# Patient Record
Sex: FEMALE | Race: BLACK OR AFRICAN AMERICAN | Employment: FULL TIME | URBAN - METROPOLITAN AREA
[De-identification: names, ages, dates, MRNs, and addresses within clinical notes are randomized per-mention and may not be internally consistent; named-entity substitution may affect disease eponyms.]

---

## 2019-03-11 ENCOUNTER — HOSPITAL ENCOUNTER (EMERGENCY)
Facility: HOSPITAL | Age: 19
Discharge: HOME | End: 2019-03-11
Attending: EMERGENCY MEDICINE
Payer: MEDICAID

## 2019-03-11 VITALS
WEIGHT: 293 LBS | RESPIRATION RATE: 20 BRPM | BODY MASS INDEX: 48.82 KG/M2 | TEMPERATURE: 98 F | HEIGHT: 65 IN | DIASTOLIC BLOOD PRESSURE: 63 MMHG | SYSTOLIC BLOOD PRESSURE: 104 MMHG | OXYGEN SATURATION: 98 % | HEART RATE: 82 BPM

## 2019-03-11 DIAGNOSIS — T78.40XA ALLERGIC REACTION, INITIAL ENCOUNTER: ICD-10-CM

## 2019-03-11 DIAGNOSIS — L03.115 CELLULITIS OF RIGHT LEG WITHOUT FOOT: Primary | ICD-10-CM

## 2019-03-11 PROCEDURE — 99283 EMERGENCY DEPT VISIT LOW MDM: CPT

## 2019-03-11 RX ORDER — LAMOTRIGINE 25 MG/1
TABLET, CHEWABLE ORAL
Status: ON HOLD | COMMUNITY
End: 2021-04-08

## 2019-03-11 RX ORDER — TOPIRAMATE 25 MG/1
25 TABLET ORAL 2 TIMES DAILY
Status: ON HOLD | COMMUNITY
End: 2021-04-08

## 2019-03-11 RX ORDER — CEFADROXIL 500 MG/1
500 CAPSULE ORAL 2 TIMES DAILY
Qty: 20 CAPSULE | Refills: 0 | Status: SHIPPED | OUTPATIENT
Start: 2019-03-11 | End: 2019-03-21

## 2019-03-11 RX ORDER — WARFARIN SODIUM 5 MG/1
5 TABLET ORAL NIGHTLY
Status: ON HOLD | COMMUNITY
End: 2021-04-08

## 2019-03-11 RX ORDER — WARFARIN SODIUM 4 MG/1
4 TABLET ORAL DAILY
Status: ON HOLD | COMMUNITY
End: 2021-04-08

## 2019-03-11 RX ORDER — HYDROCODONE BITARTRATE AND ACETAMINOPHEN 10; 325 MG/1; MG/1
1 TABLET ORAL EVERY 6 HOURS PRN
Status: ON HOLD | COMMUNITY
End: 2021-04-08

## 2019-03-11 RX ORDER — DIPHENHYDRAMINE HCL 25 MG
25 CAPSULE ORAL ONCE
Status: COMPLETED | OUTPATIENT
Start: 2019-03-11 | End: 2019-03-11

## 2019-03-12 NOTE — ED INITIAL ASSESSMENT (HPI)
States that she was in the hospital on 3/8 for DVT's and was released. States she has started to develop \" bumps\" to the LE. No C./I fever. Has swelling to the RLE. No fever.  No drainage., No other complaints

## 2019-03-12 NOTE — ED PROVIDER NOTES
Patient Seen in: [facility] AND [facility] Emergency Department    History     Stated Complaint: Cellulitis    HPI    19year old female with morbid obesity,recent dx DVT on coumadin who presents with itching and redness to RLE.  Pt reports while in hospital she Skin: Skin is warm and dry. Rash (+ few small bumps without papule or pustule, no induration -- located scattered on legs and few to dorsal arms ) noted. Rash is macular. She is not diaphoretic. Psychiatric: She has a normal mood and affect.  Her beh

## 2021-04-07 ENCOUNTER — APPOINTMENT (OUTPATIENT)
Dept: GENERAL RADIOLOGY | Facility: HOSPITAL | Age: 21
DRG: 603 | End: 2021-04-07
Payer: MEDICAID

## 2021-04-07 ENCOUNTER — HOSPITAL ENCOUNTER (OUTPATIENT)
Facility: HOSPITAL | Age: 21
Setting detail: OBSERVATION
Discharge: HOME | DRG: 603 | End: 2021-04-09
Admitting: HOSPITALIST
Payer: MEDICAID

## 2021-04-07 DIAGNOSIS — L03.011 CELLULITIS OF FINGER OF RIGHT HAND: ICD-10-CM

## 2021-04-07 DIAGNOSIS — T78.2XXA ANAPHYLAXIS, INITIAL ENCOUNTER: Primary | ICD-10-CM

## 2021-04-07 PROCEDURE — 80048 BASIC METABOLIC PNL TOTAL CA: CPT | Performed by: EMERGENCY MEDICINE

## 2021-04-07 PROCEDURE — 85610 PROTHROMBIN TIME: CPT | Performed by: EMERGENCY MEDICINE

## 2021-04-07 PROCEDURE — 94640 AIRWAY INHALATION TREATMENT: CPT

## 2021-04-07 PROCEDURE — 83540 ASSAY OF IRON: CPT | Performed by: HOSPITALIST

## 2021-04-07 PROCEDURE — 85060 BLOOD SMEAR INTERPRETATION: CPT | Performed by: EMERGENCY MEDICINE

## 2021-04-07 PROCEDURE — 96375 TX/PRO/DX INJ NEW DRUG ADDON: CPT

## 2021-04-07 PROCEDURE — 73140 X-RAY EXAM OF FINGER(S): CPT

## 2021-04-07 PROCEDURE — 96372 THER/PROPH/DIAG INJ SC/IM: CPT

## 2021-04-07 PROCEDURE — 93005 ELECTROCARDIOGRAM TRACING: CPT

## 2021-04-07 PROCEDURE — 93010 ELECTROCARDIOGRAM REPORT: CPT | Performed by: EMERGENCY MEDICINE

## 2021-04-07 PROCEDURE — S0028 INJECTION, FAMOTIDINE, 20 MG: HCPCS

## 2021-04-07 PROCEDURE — 85652 RBC SED RATE AUTOMATED: CPT | Performed by: EMERGENCY MEDICINE

## 2021-04-07 PROCEDURE — 85025 COMPLETE CBC W/AUTO DIFF WBC: CPT | Performed by: EMERGENCY MEDICINE

## 2021-04-07 PROCEDURE — 84466 ASSAY OF TRANSFERRIN: CPT | Performed by: HOSPITALIST

## 2021-04-07 PROCEDURE — 96374 THER/PROPH/DIAG INJ IV PUSH: CPT

## 2021-04-07 PROCEDURE — 99291 CRITICAL CARE FIRST HOUR: CPT

## 2021-04-07 PROCEDURE — 86140 C-REACTIVE PROTEIN: CPT | Performed by: EMERGENCY MEDICINE

## 2021-04-07 RX ORDER — FAMOTIDINE 10 MG/ML
INJECTION, SOLUTION INTRAVENOUS
Status: COMPLETED
Start: 2021-04-07 | End: 2021-04-07

## 2021-04-07 RX ORDER — FAMOTIDINE 10 MG/ML
20 INJECTION, SOLUTION INTRAVENOUS ONCE
Status: COMPLETED | OUTPATIENT
Start: 2021-04-07 | End: 2021-04-07

## 2021-04-07 RX ORDER — KETOROLAC TROMETHAMINE 15 MG/ML
15 INJECTION, SOLUTION INTRAMUSCULAR; INTRAVENOUS ONCE
Status: COMPLETED | OUTPATIENT
Start: 2021-04-07 | End: 2021-04-07

## 2021-04-07 RX ORDER — ONDANSETRON 2 MG/ML
4 INJECTION INTRAMUSCULAR; INTRAVENOUS EVERY 6 HOURS PRN
Status: DISCONTINUED | OUTPATIENT
Start: 2021-04-07 | End: 2021-04-09

## 2021-04-07 RX ORDER — HYDROCODONE BITARTRATE AND ACETAMINOPHEN 5; 325 MG/1; MG/1
1 TABLET ORAL EVERY 4 HOURS PRN
Status: DISCONTINUED | OUTPATIENT
Start: 2021-04-07 | End: 2021-04-09

## 2021-04-07 RX ORDER — MORPHINE SULFATE 4 MG/ML
4 INJECTION, SOLUTION INTRAMUSCULAR; INTRAVENOUS ONCE
Status: COMPLETED | OUTPATIENT
Start: 2021-04-07 | End: 2021-04-07

## 2021-04-07 RX ORDER — VANCOMYCIN 2 GRAM/500 ML IN 0.9 % SODIUM CHLORIDE INTRAVENOUS
2000 EVERY 12 HOURS
Status: DISCONTINUED | OUTPATIENT
Start: 2021-04-08 | End: 2021-04-09

## 2021-04-07 RX ORDER — DIPHENHYDRAMINE HYDROCHLORIDE 50 MG/ML
25 INJECTION INTRAMUSCULAR; INTRAVENOUS ONCE
Status: COMPLETED | OUTPATIENT
Start: 2021-04-07 | End: 2021-04-07

## 2021-04-07 RX ORDER — VANCOMYCIN 2 GRAM/500 ML IN 0.9 % SODIUM CHLORIDE INTRAVENOUS
25 ONCE
Status: COMPLETED | OUTPATIENT
Start: 2021-04-07 | End: 2021-04-07

## 2021-04-07 RX ORDER — HYDROCODONE BITARTRATE AND ACETAMINOPHEN 5; 325 MG/1; MG/1
2 TABLET ORAL EVERY 4 HOURS PRN
Status: DISCONTINUED | OUTPATIENT
Start: 2021-04-07 | End: 2021-04-09

## 2021-04-07 RX ORDER — METHYLPREDNISOLONE SODIUM SUCCINATE 125 MG/2ML
125 INJECTION, POWDER, LYOPHILIZED, FOR SOLUTION INTRAMUSCULAR; INTRAVENOUS ONCE
Status: COMPLETED | OUTPATIENT
Start: 2021-04-07 | End: 2021-04-07

## 2021-04-07 RX ORDER — IPRATROPIUM BROMIDE AND ALBUTEROL SULFATE 2.5; .5 MG/3ML; MG/3ML
3 SOLUTION RESPIRATORY (INHALATION) ONCE
Status: COMPLETED | OUTPATIENT
Start: 2021-04-07 | End: 2021-04-07

## 2021-04-07 RX ORDER — HEPARIN SODIUM 5000 [USP'U]/ML
5000 INJECTION, SOLUTION INTRAVENOUS; SUBCUTANEOUS EVERY 8 HOURS SCHEDULED
Status: DISCONTINUED | OUTPATIENT
Start: 2021-04-07 | End: 2021-04-08

## 2021-04-07 RX ORDER — DIPHENHYDRAMINE HCL 25 MG
25 CAPSULE ORAL EVERY 6 HOURS PRN
Status: DISCONTINUED | OUTPATIENT
Start: 2021-04-07 | End: 2021-04-09

## 2021-04-07 RX ORDER — DIPHENHYDRAMINE HYDROCHLORIDE 50 MG/ML
INJECTION INTRAMUSCULAR; INTRAVENOUS
Status: COMPLETED
Start: 2021-04-07 | End: 2021-04-07

## 2021-04-07 RX ORDER — METHYLPREDNISOLONE SODIUM SUCCINATE 125 MG/2ML
INJECTION, POWDER, LYOPHILIZED, FOR SOLUTION INTRAMUSCULAR; INTRAVENOUS
Status: COMPLETED
Start: 2021-04-07 | End: 2021-04-07

## 2021-04-07 RX ORDER — ACETAMINOPHEN 325 MG/1
650 TABLET ORAL EVERY 4 HOURS PRN
Status: DISCONTINUED | OUTPATIENT
Start: 2021-04-07 | End: 2021-04-09

## 2021-04-07 NOTE — ED INITIAL ASSESSMENT (HPI)
Patient complaining of pain to her right ring finger for the past 2 days. States she has been unable to move it due to pain.

## 2021-04-07 NOTE — ED QUICK NOTES
Orders for admission, patient is aware of plan and ready to go upstairs. Any questions, please call ED RN Pipo Lang at extension [phone].    Type of COVID test sent:Rapid  COVID Suspicion level: Low    Titratable drug(s) infusing:  Rate:    LOC at time of transpor

## 2021-04-07 NOTE — ED QUICK NOTES
Pt became itchy after starting unasyn. Pt verified she has no known allergies. Pt began coughing/wheezing with spo2 85%. Dr. Breana Corrales at bedside, respiratory at bedside for neb and meds given.

## 2021-04-07 NOTE — ED PROVIDER NOTES
Patient Seen in: [facility] AND [facility] Emergency Department      History   Patient presents with:  Arm or Hand Injury    Stated Complaint: Pain right ring finger    HPI/Subjective:   HPI    20-year-old female presents for evaluation of her severe pain to her and regular rhythm. Pulses: Normal pulses. Pulmonary:      Effort: Pulmonary effort is normal. No respiratory distress. Breath sounds: Normal air entry. Abdominal:      General: Abdomen is flat.  Bowel sounds are normal.      Palpations: Abdom ---------                               -----------         ------                     CBC W/ DIFFERENTIAL[[id number]]          Abnormal            Preliminary result           Please view results for these tests on the individual orders.    MD BLOOD SMEAR EFFUSION: None visible. OTHER: Negative. CONCLUSION:  1. No osseous abnormalities. 2. No radiopaque soft tissue foreign body.     Dictated by (CST): Eliana Bingham MD on 4/07/2021 at 2:19 PM     Finalized by (CST): David Bingham M

## 2021-04-08 ENCOUNTER — APPOINTMENT (OUTPATIENT)
Dept: ULTRASOUND IMAGING | Facility: HOSPITAL | Age: 21
DRG: 603 | End: 2021-04-08
Attending: HOSPITALIST
Payer: MEDICAID

## 2021-04-08 PROCEDURE — 85027 COMPLETE CBC AUTOMATED: CPT | Performed by: HOSPITALIST

## 2021-04-08 PROCEDURE — 93970 EXTREMITY STUDY: CPT | Performed by: HOSPITALIST

## 2021-04-08 PROCEDURE — 80048 BASIC METABOLIC PNL TOTAL CA: CPT | Performed by: HOSPITALIST

## 2021-04-08 PROCEDURE — 85060 BLOOD SMEAR INTERPRETATION: CPT | Performed by: HOSPITALIST

## 2021-04-08 PROCEDURE — S0028 INJECTION, FAMOTIDINE, 20 MG: HCPCS | Performed by: HOSPITALIST

## 2021-04-08 RX ORDER — DIPHENHYDRAMINE HYDROCHLORIDE 50 MG/ML
25 INJECTION INTRAMUSCULAR; INTRAVENOUS 2 TIMES DAILY PRN
Status: DISCONTINUED | OUTPATIENT
Start: 2021-04-08 | End: 2021-04-09

## 2021-04-08 RX ORDER — HEPARIN SODIUM 5000 [USP'U]/ML
7500 INJECTION, SOLUTION INTRAVENOUS; SUBCUTANEOUS EVERY 8 HOURS SCHEDULED
Status: DISCONTINUED | OUTPATIENT
Start: 2021-04-08 | End: 2021-04-09

## 2021-04-08 RX ORDER — CLINDAMYCIN HYDROCHLORIDE 300 MG/1
300 CAPSULE ORAL 3 TIMES DAILY
Qty: 30 CAPSULE | Refills: 0 | Status: SHIPPED | OUTPATIENT
Start: 2021-04-08 | End: 2021-04-09

## 2021-04-08 RX ORDER — METHYLPREDNISOLONE SODIUM SUCCINATE 125 MG/2ML
125 INJECTION, POWDER, LYOPHILIZED, FOR SOLUTION INTRAMUSCULAR; INTRAVENOUS ONCE
Status: COMPLETED | OUTPATIENT
Start: 2021-04-08 | End: 2021-04-08

## 2021-04-08 RX ORDER — DOXYCYCLINE HYCLATE 100 MG/1
100 CAPSULE ORAL EVERY 12 HOURS SCHEDULED
Status: DISCONTINUED | OUTPATIENT
Start: 2021-04-09 | End: 2021-04-09

## 2021-04-08 RX ORDER — MELATONIN
325
Qty: 30 TABLET | Refills: 0 | Status: SHIPPED | OUTPATIENT
Start: 2021-04-09

## 2021-04-08 RX ORDER — MELATONIN
325
Status: DISCONTINUED | OUTPATIENT
Start: 2021-04-09 | End: 2021-04-09

## 2021-04-08 RX ORDER — FAMOTIDINE 10 MG/ML
20 INJECTION, SOLUTION INTRAVENOUS ONCE
Status: COMPLETED | OUTPATIENT
Start: 2021-04-08 | End: 2021-04-08

## 2021-04-08 RX ORDER — CLINDAMYCIN HYDROCHLORIDE 300 MG/1
300 CAPSULE ORAL ONCE
Status: COMPLETED | OUTPATIENT
Start: 2021-04-08 | End: 2021-04-08

## 2021-04-08 NOTE — H&P
[facility] Hospitalist H&P     CC: Patient presents with:  Arm or Hand Injury     PCP: None Pcp    Date of Admission: 4/7/2021  1:56 PM    ASSESSMENT / PLAN:     Ms. Delmer Dang is a 22 yo F with PMH of morbid obesity, extensive bilateral DVT 2019 no longer on couma past 2 days. She recently had a new tattoo on her R and L forearm. She states she did notice some pain and purulent drainage from the tattoo on there L arm but then noticed she had severe finger pain and inability to move her R ring finger.  Denies fevers o 04/07/2021       No results for input(s): TROP in the last 168 hours. Additional Diagnostics:    Radiology: XR FINGER(S) (MIN 2 VIEWS), RIGHT 4TH (CPT=73140)    Result Date: 4/7/2021  CONCLUSION:  1. No osseous abnormalities.  2. No radiopaque soft tissu

## 2021-04-08 NOTE — PROGRESS NOTES
[facility] Dosing Service    Initial Pharmacokinetic Consult for Vancomycin Dosing     Alba Blake is a 23year old patient who is being treated for  tenosynovitis .   Pharmacy has been asked to dose Vancomycin by Dr. Wen Adjutant:  Clinton Weber [

## 2021-04-08 NOTE — PROGRESS NOTES
[facility] Hospitalist Progress Note     CC: Hospital Follow up    PCP: None Pcp       Assessment/Plan:     Principal Problem:    Anaphylaxis, initial encounter  Active Problems:    Cellulitis of finger of right hand    Anaphylactic syndrome    Ms. Agustin Backer is a 23 Subjective:     Ring finger feels much improved. Ortho eval, no need for surgery. OBJECTIVE:    Blood pressure 124/71, pulse 78, temperature 97.7 °F (36.5 °C), temperature source Oral, resp.  rate 16, height 5' 7.2\" (1.707 m), weight (!) 470 lb (213.2 (FOO=[id number])    Result Date: 4/8/2021  CONCLUSION:  1. Right femoral and both popliteal veins are normal. 2. 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  3. The distal left femoral vein as well as the infrapopliteal veins are not seen bilateral

## 2021-04-08 NOTE — CM/SW NOTE
Received MD[facility] for list of in network list of OB & PCP options. Reviewed [employer] website, printed list of OB & PCP options.  Provided Karina MCCANN w/ list.    Lauren Patel, [address]

## 2021-04-08 NOTE — CONSULTS
[facility]    Report of Consultation    Ren Willingham Patient Status:  Inpatient    2000 MRN [id number]   Location [facility] 4W/SW/SE Attending Silverio Blue MD   Hosp Day # 1 PCP None Pcp     Date of Admission:   scleral icterus. Mucous membranes are moist. Pupils are equal and round, reactive to light and accommodate. Pupils are approximately 3mm and react to 2mm with reaction to light. Oropharynx is clear. Normocephalic, atraumatic.   Neck: No tenderness to pa patient:  [address]  4/8/2021  11:46 AM

## 2021-04-08 NOTE — PAYOR COMM NOTE
--------------  ADMISSION REVIEW     Payor: [address] [place] [address] #:  [id number]  Authorization Number: [id number]    Admit date: 4/7/21  Admit time:  8:40 PM       Admitting Physician: Amanda Lovnig MD  Attending Physician:  Shawnee Melendez, pulses. Pulmonary:      Effort: Pulmonary effort is normal. No respiratory distress. Breath sounds: Normal air entry. Abdominal:      General: Abdomen is flat. Bowel sounds are normal.      Palpations: Abdomen is soft.    Musculoskeletal:         G Benadryl, Pepcid, and IM epinephrine. She also received a DuoNeb. EKG    Rate, intervals and axes as noted on EKG Report. Rate: 125  Rhythm: Sinus Rhythm  Reading: No STEMI. Interpreted by ED physician. MDM    CBC BMP unremarkable.   Guille Melena (Physician)           Bryan Srinivasan H&P     CC: Patient presents with:  Arm or Hand Injury     PCP: None Pcp    Date of Admission: 4/7/2021  1:56 PM    ASSESSMENT / PLAN:     Ms. Jb Hartmann is a 24 yo F with PMH of morbid obesity, extensive bilateral  EPINEPHrine HCl (ADRENALIN) 1 MG/ML injection 0.3 mg     Date Action Dose Route User    4/7/2021 1808 Given 0.3 mg Intramuscular (Left Arm) Wilfredo Dry, RN      famoTIDine (PEPCID) injection 20 mg     Date Action Dose Route User    4/7/2021 1809 G

## 2021-04-08 NOTE — PROGRESS NOTES
[facility] Pharmacy Note:  Anticoagulation Weight Dose Adjustment for heparin    Deondre Duarte is a 23year old patient who has been prescribed heparin 5000 units every 8 hours. Estimated Creatinine Clearance: 97.2 mL/min (based on SCr of 0.89 mg/dL).  Body

## 2021-04-08 NOTE — PLAN OF CARE
Pt is alert and oriented x4. NPO since midnight. Denies any nausea. Pt states \"pain to finger has improved. \" Pt denies any shortness of breath. IV Vanco finished without any reaction noted. Voiding. Up with standby assistance. Fall precautions in place. drain sites and surrounding tissue  - Implement wound care per orders  - Initiate isolation precautions as appropriate  - Initiate Pressure Ulcer prevention bundle as indicated  Outcome: Progressing     Problem: MUSCULOSKELETAL - ADULT  Goal: Maintain prop

## 2021-04-08 NOTE — PLAN OF CARE
Ingrid Garibay is aware of POC at this time. No complaints of pain througout the shift. IV abx given per orders. Rate of vanco decreased to 175mL/hr as patient was c/o itching. Benadryl given with relief.      ID saw patient and plan was for to discharge w pressure ulcer development  - Assess and document skin integrity  - Assess and document dressing/incision, wound bed, drain sites and surrounding tissue  - Implement wound care per orders  - Initiate isolation precautions as appropriate  - Initiate Pressur Consider OT/PT consult to assist with strengthening/mobility  - Encourage toileting schedule  Outcome: Progressing

## 2021-04-08 NOTE — CONSULTS
[facility] AND [facility] Infectious Disease  Report of Consultation    Perry Kincaid Patient Status:  Inpatient    2000 MRN [id number]   Location [facility] 4W/SW/SE Attending Deysi Tomlin MD   Hosp Day # 1 PCP None Pcp     Date of A IVPB premix 2g in 0.9% NaCl 500 mL, 2,000 mg, Intravenous, Q12H  •  diphenhydrAMINE (BENADRYL) cap/tab 25 mg, 25 mg, Oral, Q6H PRN    Review of Systems:    Constitutional:  No fevers, chills, diaphoresis, weight changes.    HEENT:  No visual changes, oral u [Urine:550]    Physical Exam:   General: Awake, alert, non-tox and in NAD. Head: Normocephalic, without obvious abnormality, atraumatic. Eyes: Conjunctivae/corneas clear. No scleral icterus. No conjunctival     hemorrhage.    Nose: Nares normal.   Thr Disease  [phone]    4/8/2021  12:49 PM

## 2021-04-09 VITALS
WEIGHT: 293 LBS | BODY MASS INDEX: 45.45 KG/M2 | HEART RATE: 72 BPM | TEMPERATURE: 98 F | DIASTOLIC BLOOD PRESSURE: 65 MMHG | OXYGEN SATURATION: 100 % | RESPIRATION RATE: 18 BRPM | HEIGHT: 67.2 IN | SYSTOLIC BLOOD PRESSURE: 130 MMHG

## 2021-04-09 PROCEDURE — 80202 ASSAY OF VANCOMYCIN: CPT | Performed by: HOSPITALIST

## 2021-04-09 RX ORDER — DOXYCYCLINE HYCLATE 100 MG/1
100 CAPSULE ORAL EVERY 12 HOURS SCHEDULED
Qty: 20 CAPSULE | Refills: 0 | Status: SHIPPED | OUTPATIENT
Start: 2021-04-09 | End: 2021-04-19

## 2021-04-09 NOTE — PLAN OF CARE
Pt is alert and oriented x4. Tolerating general diet. Pt denies any shortness of breath or itching. IV Vanco finished without any reaction noted. Voiding. Up independently. Fall precautions in place. Call light within reach.      Problem: Patient Centered C care per orders  - Initiate isolation precautions as appropriate  - Initiate Pressure Ulcer prevention bundle as indicated  Outcome: Progressing     Problem: MUSCULOSKELETAL - ADULT  Goal: Maintain proper alignment of affected body part  Description: INTER

## 2021-04-09 NOTE — PLAN OF CARE
Discharge instructions given to patient. She verbalizes understanding. PIV removed. She tolerated doxycyline. No reaction observed. Pt taken home with mother.     Problem: Patient Centered Care  Goal: Patient preferences are identified and integrated in the Initiate isolation precautions as appropriate  - Initiate Pressure Ulcer prevention bundle as indicated  Outcome: Adequate for Discharge     Problem: MUSCULOSKELETAL - ADULT  Goal: Maintain proper alignment of affected body part  Description: INTERVENTIONS

## 2021-04-09 NOTE — DISCHARGE SUMMARY
General Medicine Discharge Summary     Patient ID:  Chula Odom  23year old  11/6/2000    Admit date: 4/7/2021    Discharge date and time: 04/09/21    Attending Physician: Ji Raygoza MD     Primary Care Physician: None Pcp     Reason for admis anaphylaxis, now on vancomycin  - ortho evaluated today, much improved overnight with IV abx only, no need for surgery     Anaphylactic reaction   - occurred after receiving Unasyn, no known drug allergies  - s/p epinephrine, solumedrol, benadryl, pepcid, 12:28 PM     Finalized by (CST): Ke Fernandes MD on 4/08/2021 at 12:38 PM                Home Medication Changes:     I reconciled current and discharge medications on day of discharge. These medication changes have been made as below.      Start  Doxy

## 2021-04-09 NOTE — PROGRESS NOTES
Chula Odom is a 23year old female. Patient presents with:  Arm or Hand Injury      HPI:    Problem with clindamycin itching resolved  Tolerated doxy  Finger feeling better per pt.     REVIEW OF SYSTEMS:   A comprehensive 11 point review of systems w desired  4.  Spoke with pt re:anemia and texted with dr mchugh                 Results for orders placed or performed during the hospital encounter of 70/19/71   BASIC METABOLIC PANEL (8)   Result Value Ref Range    Glucose 84 70 - 99 mg/dL    Sodium 139 136 - Neutrophils Present (A) (none)   IRON AND TIBC   Result Value Ref Range    Iron 14 (L) 50 - 170 ug/dL    Transferrin 290 200 - 360 mg/dL    Total Iron Binding Capacity 432 240 - 450 ug/dL    % Saturation 3 (L) 15 - 50 %   BASIC METABOLIC PANEL (8)   Result 21.1 (H) 11.0 - 15.0 %    .0 (H) 150.0 - 450.0 10(3)uL    Neutrophil Absolute Prelim 4.14 1.50 - 7.70 x10 (3) uL    Neutrophil Absolute 4.14 1.50 - 7.70 x10(3) uL    Lymphocyte Absolute 2.28 1.00 - 4.00 x10(3) uL    Monocyte Absolute 0.67 0.10 - 1.0

## 2021-04-12 ENCOUNTER — PATIENT OUTREACH (OUTPATIENT)
Dept: CASE MANAGEMENT | Age: 21
End: 2021-04-12

## 2021-04-12 NOTE — PAYOR COMM NOTE
--------------  DISCHARGE REVIEW    Payor: [address] [place] [address] #:  [id number]  Authorization Number: [id number]    Admit date: 4/7/21  Admit time:   8:40 PM  Discharge Date: 4/9/2021  2:37 PM     Admitting Physician: Ember Munoz MD  Attendi by anaphylaxis to Unasyn in the ER, received epinephrine, solumedrol, pepcid and benadryl. Tolerated vancomycin during stay. ID planned for clindamycin on discharge however patient did complain of tongue and throat itching so this was discontinued.  Plan fo RIGHT 4TH (CPT=73140)    Result Date: 4/7/2021  CONCLUSION:  1. No osseous abnormalities. 2. No radiopaque soft tissue foreign body.     Dictated by (CST): Ruben Bingham MD on 4/07/2021 at 2:19 PM     Finalized by (CST): Ruben Bingham MD instructions:         Follow-up with labs: cbc    Total Time Coordinating Care: Greater than 30 minutes    Patient had opportunity to ask questions and state understand and agree with therapeutic plan as outlined      Onetha Sicard, MD  [place] Hospitalist

## 2021-04-12 NOTE — PROGRESS NOTES
NCM attempted to reach pt for HFU/condition update. Someone answered phone, but then hung up. Tried calling back and got a busy tone. Will try again. Need to confirm:  Insurance, PCP?, and offer TCC appt.

## 2021-04-13 NOTE — PROGRESS NOTES
NCM attempted to contact patient for hospital follow up. Patient answered and said she was busy and that she will call back. NCM provided contact information.

## 2021-04-19 NOTE — PAYOR COMM NOTE
FOR RECONSIDERATION AND APPROVAL PLEASE    CONTINUED STAY REVIEW    Payor: [address] [place] [address] #:  [id number]  Authorization Number: [id number]    Admit date: 4/7/21  Admit time:  8:40 PM    Admitting Physician: Patsy Boss MD  Attending Ph ESRML [id number] 04/07/2021     CRP 1.73 04/07/2021     Assessment and Plan:  1.   Post-tattoo cellulitis to the R hand with swelling and pain concentrated over the R 4th finger  - No evidence of deeper seated infection or tenosynovitis per ortho  - IV vancomycin o 04/07/21 1804 End: 04/07/21 1804 1804-Given              Doxycycline Hyclate (VIBRAMYCIN) cap 100 mg   Dose: 100 mg  Freq: 2 times per day Route: OR  Start: 04/09/21 1000 End: 04/09/21 1637    Order specific questions:   What infection is this being use Sodium Succ (Solu-MEDROL) injection 125 mg   Dose: 125 mg  Freq:  Once Route: IV  Start: 04/08/21 1715 End: 04/08/21 1838    Admin Instructions:   Amy Douglas   : cabinet override  Reconstitute with 2ml sterile water or saline     1838-Given                diphenhydrAMINE (BENADRYL) cap/tab 25 mg   Dose: 25 mg  Freq: Every 6 hours PRN Route: OR  PRN Reason: Itching  Start: 04/07/21 2233 End: 04/09/21 1637     0849-Given          1637-D/C'd          diphenhydrAMINE HCl (BENADRYL) IV PUSH injection 25

## 2021-07-07 ENCOUNTER — HOSPITAL ENCOUNTER (EMERGENCY)
Facility: HOSPITAL | Age: 21
Discharge: HOME | End: 2021-07-07
Attending: EMERGENCY MEDICINE
Payer: MEDICAID

## 2021-07-07 VITALS
TEMPERATURE: 98 F | RESPIRATION RATE: 22 BRPM | WEIGHT: 293 LBS | BODY MASS INDEX: 47.09 KG/M2 | SYSTOLIC BLOOD PRESSURE: 141 MMHG | HEART RATE: 93 BPM | DIASTOLIC BLOOD PRESSURE: 76 MMHG | HEIGHT: 66 IN | OXYGEN SATURATION: 97 %

## 2021-07-07 DIAGNOSIS — L29.9 PRURITUS: Primary | ICD-10-CM

## 2021-07-07 LAB — B-HCG UR QL: NEGATIVE

## 2021-07-07 PROCEDURE — 99283 EMERGENCY DEPT VISIT LOW MDM: CPT

## 2021-07-07 PROCEDURE — 81025 URINE PREGNANCY TEST: CPT

## 2021-07-07 RX ORDER — FAMOTIDINE 40 MG/1
40 TABLET, FILM COATED ORAL DAILY
Qty: 7 TABLET | Refills: 0 | Status: SHIPPED | OUTPATIENT
Start: 2021-07-07 | End: 2021-07-14

## 2021-07-07 RX ORDER — PREDNISONE 20 MG/1
40 TABLET ORAL DAILY
Qty: 10 TABLET | Refills: 0 | Status: SHIPPED | OUTPATIENT
Start: 2021-07-07 | End: 2021-07-07

## 2021-07-07 RX ORDER — FAMOTIDINE 40 MG/1
40 TABLET, FILM COATED ORAL DAILY
Qty: 7 TABLET | Refills: 0 | Status: SHIPPED | OUTPATIENT
Start: 2021-07-07 | End: 2021-07-07

## 2021-07-07 RX ORDER — PREDNISONE 20 MG/1
40 TABLET ORAL DAILY
Qty: 10 TABLET | Refills: 0 | Status: SHIPPED | OUTPATIENT
Start: 2021-07-07 | End: 2021-07-12

## 2021-07-07 NOTE — ED PROVIDER NOTES
Patient Seen in: [facility] AND [facility] Emergency Department      History   Patient presents with:   Allergic Rxn Allergies    Stated Complaint: allergic reaction     HPI/Subjective:   HPI  64-year-old female with history of anaphylactic reaction to Unasyn, pr normal.      Breath sounds: Normal breath sounds. Abdominal:      General: There is no distension. Palpations: Abdomen is soft. Tenderness: There is no abdominal tenderness. Musculoskeletal:         General: Normal range of motion.       [employer] tablets (40 mg total) by mouth daily for 5 days.   Qty: 10 tablet Refills: 0

## 2021-07-07 NOTE — ED INITIAL ASSESSMENT (HPI)
Patient presents to ER with complaints of an allergic reaction. Unknown cause, states her entire body is itching, her ears feel hot. Patient notes this has been going on for 2 days, no improvement with benadryl.    No distress noted in triage, able to s

## 2022-06-02 ENCOUNTER — APPOINTMENT (OUTPATIENT)
Dept: BARIATRICS/WEIGHT MGMT | Age: 22
End: 2022-06-02

## (undated) NOTE — ED AVS SNAPSHOT
Sebas Weir   MRN: [id number]    Department:  [facility] Emergency Department   Date of Visit:  3/11/2019           Disclosure     Insurance plans vary and the physician(s) referred by the ER may not be covered by your plan.  Please contact CARE PHYSICIAN AT ONCE OR RETURN IMMEDIATELY TO THE EMERGENCY DEPARTMENT. If you have been prescribed any medication(s), please fill your prescription right away and begin taking the medication(s) as directed.   If you believe that any of the medications

## (undated) NOTE — LETTER
April 9, 2021      Patient name: Surekha Kang        To whom it may concern,       Please excuse Ms. Louise Lund from work as she has been admitted to the hospital from April 7, 2021- April 9, 2021.           Sincerely,           Josey Forbes MD